# Patient Record
Sex: FEMALE | Race: WHITE | NOT HISPANIC OR LATINO | Employment: FULL TIME | URBAN - METROPOLITAN AREA
[De-identification: names, ages, dates, MRNs, and addresses within clinical notes are randomized per-mention and may not be internally consistent; named-entity substitution may affect disease eponyms.]

---

## 2023-11-06 ENCOUNTER — TELEPHONE (OUTPATIENT)
Dept: ENDOCRINOLOGY | Facility: CLINIC | Age: 43
End: 2023-11-06
Payer: COMMERCIAL

## 2023-11-06 NOTE — TELEPHONE ENCOUNTER
Message left for Moon Nunes  Informing her that her appointment for  11/09/2023 needs to be rescheduled . Patient rescheduled for first available and placed on wait list . Nancy Dolan LPN

## 2023-11-08 PROBLEM — R73.03 PREDIABETES: Status: ACTIVE | Noted: 2023-11-08

## 2023-11-08 PROBLEM — R06.02 SHORTNESS OF BREATH: Status: ACTIVE | Noted: 2023-11-08

## 2023-11-08 PROBLEM — E24.9 CUSHING SYNDROME (MULTI): Status: ACTIVE | Noted: 2023-11-08

## 2023-11-08 PROBLEM — E05.90 HYPERTHYROIDISM: Status: ACTIVE | Noted: 2023-11-08

## 2023-11-08 PROBLEM — E04.1 THYROID NODULE: Status: ACTIVE | Noted: 2023-11-08

## 2023-11-08 RX ORDER — CITALOPRAM 10 MG/1
10 TABLET ORAL
COMMUNITY
Start: 2023-10-13

## 2023-11-08 RX ORDER — MIRABEGRON 50 MG/1
50 TABLET, FILM COATED, EXTENDED RELEASE ORAL DAILY
COMMUNITY

## 2023-11-08 RX ORDER — KETOCONAZOLE 20 MG/ML
SHAMPOO, SUSPENSION TOPICAL
COMMUNITY
Start: 2023-04-25

## 2023-11-08 RX ORDER — LEVOTHYROXINE SODIUM 75 UG/1
75 TABLET ORAL DAILY
COMMUNITY

## 2023-11-08 RX ORDER — LEVOTHYROXINE AND LIOTHYRONINE 38; 9 UG/1; UG/1
1 TABLET ORAL
COMMUNITY
Start: 2023-07-17 | End: 2024-03-12

## 2023-11-08 RX ORDER — ONDANSETRON 4 MG/1
TABLET, ORALLY DISINTEGRATING ORAL
COMMUNITY
Start: 2023-07-18

## 2023-11-08 RX ORDER — PROMETHAZINE HYDROCHLORIDE 25 MG/1
TABLET ORAL
COMMUNITY
Start: 2023-07-07

## 2023-11-08 RX ORDER — MECLIZINE HYDROCHLORIDE 25 MG/1
TABLET ORAL
COMMUNITY
Start: 2023-07-07

## 2023-11-08 RX ORDER — PHENTERMINE HYDROCHLORIDE 37.5 MG/1
37.5 TABLET ORAL DAILY
COMMUNITY

## 2023-11-08 RX ORDER — METHYLPREDNISOLONE 4 MG/1
TABLET ORAL
COMMUNITY
Start: 2022-11-29

## 2023-11-08 RX ORDER — CELECOXIB 200 MG/1
1 CAPSULE ORAL
COMMUNITY
Start: 2019-11-07

## 2023-11-08 RX ORDER — HYDROCHLOROTHIAZIDE 12.5 MG/1
12.5 CAPSULE ORAL DAILY
COMMUNITY
End: 2024-01-15

## 2023-11-09 ENCOUNTER — APPOINTMENT (OUTPATIENT)
Dept: ENDOCRINOLOGY | Facility: CLINIC | Age: 43
End: 2023-11-09
Payer: COMMERCIAL

## 2023-11-17 RX ORDER — PHENTERMINE HYDROCHLORIDE 37.5 MG/1
37.5 TABLET ORAL DAILY
Qty: 30 TABLET | Refills: 0 | Status: CANCELLED | OUTPATIENT
Start: 2023-11-17

## 2023-11-17 NOTE — TELEPHONE ENCOUNTER
Refill request of Phentermine received for Moon Nunes from [facility]. Order pended to provider to consider. Nancy Dolan LPN      Patient will require visit prior to refill . Nancy Dolan LPN

## 2024-01-14 DIAGNOSIS — E24.9 CUSHING'S SYNDROME, UNSPECIFIED (MULTI): ICD-10-CM

## 2024-01-15 RX ORDER — HYDROCHLOROTHIAZIDE 12.5 MG/1
12.5 CAPSULE ORAL DAILY
Qty: 90 CAPSULE | Refills: 1 | Status: SHIPPED | OUTPATIENT
Start: 2024-01-15

## 2024-03-09 DIAGNOSIS — E05.90 HYPERTHYROIDISM: Primary | ICD-10-CM

## 2024-03-12 RX ORDER — LEVOTHYROXINE AND LIOTHYRONINE 38; 9 UG/1; UG/1
1 TABLET ORAL
Qty: 90 TABLET | Refills: 0 | Status: SHIPPED | OUTPATIENT
Start: 2024-03-12

## 2024-06-13 ENCOUNTER — APPOINTMENT (OUTPATIENT)
Dept: ENDOCRINOLOGY | Facility: CLINIC | Age: 44
End: 2024-06-13
Payer: COMMERCIAL

## 2024-06-13 DIAGNOSIS — E24.9 CUSHING SYNDROME (MULTI): Primary | ICD-10-CM

## 2024-06-13 DIAGNOSIS — E89.0 POSTOPERATIVE HYPOTHYROIDISM: ICD-10-CM

## 2024-06-13 DIAGNOSIS — E05.90 HYPERTHYROIDISM: ICD-10-CM

## 2024-06-13 PROCEDURE — 99214 OFFICE O/P EST MOD 30 MIN: CPT | Performed by: INTERNAL MEDICINE

## 2024-06-13 RX ORDER — PREDNISONE 20 MG/1
20 TABLET ORAL
COMMUNITY
Start: 2024-06-12 | End: 2024-09-10

## 2024-06-13 RX ORDER — LEVOTHYROXINE AND LIOTHYRONINE 57; 13.5 UG/1; UG/1
TABLET ORAL
Qty: 60 TABLET | Refills: 3 | Status: SHIPPED | OUTPATIENT
Start: 2024-06-13

## 2024-06-13 RX ORDER — LEVOTHYROXINE AND LIOTHYRONINE 38; 9 UG/1; UG/1
TABLET ORAL
Qty: 90 TABLET | Refills: 3 | Status: SHIPPED | OUTPATIENT
Start: 2024-06-13

## 2024-06-13 RX ORDER — LEVOTHYROXINE AND LIOTHYRONINE 57; 13.5 UG/1; UG/1
1 TABLET ORAL
COMMUNITY
Start: 2024-04-22 | End: 2024-06-13 | Stop reason: SDUPTHER
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
## 2024-07-06 DIAGNOSIS — E24.9 CUSHING'S SYNDROME, UNSPECIFIED (MULTI): ICD-10-CM

## 2024-07-08 RX ORDER — HYDROCHLOROTHIAZIDE 12.5 MG/1
12.5 CAPSULE ORAL DAILY
Qty: 90 CAPSULE | Refills: 1 | Status: SHIPPED | OUTPATIENT
Start: 2024-07-08

## 2024-10-25 ENCOUNTER — TELEPHONE (OUTPATIENT)
Dept: ENDOCRINOLOGY | Facility: CLINIC | Age: 44
End: 2024-10-25
Payer: COMMERCIAL

## 2024-10-25 NOTE — TELEPHONE ENCOUNTER
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 She wants to discuss changing her thyroid dosage. Please call her at [phone]

## 2024-11-07 NOTE — TELEPHONE ENCOUNTER
Reactive arthritis, like PMR  Recently diagnosed  She is on methotrexate  0.8 ml     Rheumatology at the [facility]    [place]ing     2 weeks she is taking   She feels better    90 mg adythyza  ( 130 altogether )    TSH 25.3

## 2024-11-27 DIAGNOSIS — E24.9 CUSHING'S SYNDROME, UNSPECIFIED: ICD-10-CM

## 2024-11-29 RX ORDER — HYDROCHLOROTHIAZIDE 12.5 MG/1
12.5 CAPSULE ORAL DAILY
Qty: 90 CAPSULE | Refills: 1 | Status: SHIPPED | OUTPATIENT
Start: 2024-11-29

## 2025-05-27 DIAGNOSIS — E24.9 CUSHING'S SYNDROME, UNSPECIFIED: ICD-10-CM

## 2025-05-28 RX ORDER — HYDROCHLOROTHIAZIDE 12.5 MG/1
12.5 CAPSULE ORAL DAILY
Qty: 90 CAPSULE | Refills: 1 | Status: SHIPPED | OUTPATIENT
Start: 2025-05-28

## 2025-06-17 ENCOUNTER — APPOINTMENT (OUTPATIENT)
Dept: ENDOCRINOLOGY | Facility: CLINIC | Age: 45
End: 2025-06-17
Payer: COMMERCIAL

## 2025-06-17 DIAGNOSIS — E89.0 POSTOPERATIVE HYPOTHYROIDISM: Primary | ICD-10-CM

## 2025-06-17 DIAGNOSIS — E24.9 CUSHING SYNDROME (MULTI): ICD-10-CM

## 2025-06-17 PROCEDURE — 99214 OFFICE O/P EST MOD 30 MIN: CPT | Performed by: INTERNAL MEDICINE

## 2025-06-17 ASSESSMENT — PATIENT HEALTH QUESTIONNAIRE - PHQ9
2. FEELING DOWN, DEPRESSED OR HOPELESS: NOT AT ALL
1. LITTLE INTEREST OR PLEASURE IN DOING THINGS: NOT AT ALL
SUM OF ALL RESPONSES TO PHQ9 QUESTIONS 1 AND 2: 0
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
